# Patient Record
(demographics unavailable — no encounter records)

---

## 2025-01-24 NOTE — PHYSICAL EXAM
[de-identified] : The patient is alert, cooperative, and oriented x 3. Pupils are equal and round. The patient does not have skin rash.   Gait is normal. Neck and back ROM are limited with pain. Tenderness at PVM. No myelopathy hand sign. Spurling test negative. SLR negative. DTR normal range. Motor and sensory are basically normal throughout bilateral U/E and L/E. Circulation of legs is normal. Bladder and bowel functions are normal. [de-identified] : Cervical spine MRI taken recently at [facility] (now in our system) shows cervical disc herniation at C5-6 and C6-7 (and behind C6 body), which is dominant on the right side, No clear spinal cord compression or nerve compression.  Lumbar spine MRI taken recently at [facility] (now in our system) shows cyst at L5-S1 left foramen slightly pinching left L5 nerve.

## 2025-01-24 NOTE — HISTORY OF PRESENT ILLNESS
[de-identified] : The patient is 39 years old female who has neck pain for years, on and off. She also has low back pain. The patient has right arm symptom at right finger tips. She has bilateral non specific leg pain (Rt worse than Lt). No clear cause of the symptoms. She was seen by neurosurgeon at [facility]. She is here for second opinion. Referred by Dr. Emery.   Pain Level:  10 Duration of Symptoms:  years Symptom course:  Getting worse Accident:  No   Previous Treatment:    Pain meds:  Yes    Physical therapy:  Yes    Epidural steroid injection:  Yes

## 2025-01-24 NOTE — CONSULT LETTER
[Dear  ___] : Dear  [unfilled], [Consult Letter:] : I had the pleasure of evaluating your patient, [unfilled]. [Please see my note below.] : Please see my note below. [Consult Closing:] : Thank you very much for allowing me to participate in the care of this patient.  If you have any questions, please do not hesitate to contact me. [Sincerely,] : Sincerely, [FreeTextEntry3] : Kenneth Dukes MD PhD

## 2025-01-24 NOTE — DISCUSSION/SUMMARY
[de-identified] : I examined, evaluated, and discussed with the patient. The patient has neck and low back symptoms for years. No clear myelopathy. No clear arm or leg radiculopathy.   Based on physical exam and image findings, the patient diagnosis is cervical disc herniation at C5-6 and C6-7 and lumbar cyst at left L5-S1 foramen.   Treatment plan is cervical spine CT. 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  We had a long discussion about her symptom, physical exam and imaging study results. The patient understands everything and all questions were answered. The patient will return after cervical spine CT.

## 2025-02-14 NOTE — HISTORY OF PRESENT ILLNESS
[de-identified] : Follow-up of neck pain. She still has severe neck symptom. She cannot turn neck easily. She had CT scan.  Below is the history of initial visit. The patient is 39 years old female who has neck pain for years, on and off. She also has low back pain. The patient has right arm symptom at right finger tips. She has bilateral non specific leg pain (Rt worse than Lt). No clear cause of the symptoms. She was seen by neurosurgeon at [facility]. She is here for second opinion. Referred by Dr. Emery.   Pain Level:  10 Duration of Symptoms:  years Symptom course:  Getting worse Accident:  No   Previous Treatment:    Pain meds:  Yes    Physical therapy:  Yes    Epidural steroid injection:  Yes

## 2025-02-14 NOTE — PHYSICAL EXAM
[de-identified] : The patient is alert, cooperative, and oriented x 3. Pupils are equal and round. The patient does not have skin rash.   Gait is normal. Neck and back ROM are limited with pain. Tenderness at PVM. No myelopathy hand sign. Spurling test negative. SLR negative. DTR normal range. Motor and sensory are basically normal throughout bilateral U/E and L/E. Circulation of legs is normal. Bladder and bowel functions are normal. [de-identified] : Cervical spine MRI taken recently at [facility] (now in our system) shows cervical disc herniation at C5-6 and C6-7 (and behind C6 body), which is dominant on the right side, No clear spinal cord compression or nerve compression.  Lumbar spine MRI taken recently at [facility] (now in our system) shows cyst at L5-S1 left foramen slightly pinching left L5 nerve.  Cervical spine CT taken recently at [facility] shows moderate-severe spondylosis at C5-6 and C6-7, and calcification at C5-6 disc herniation.

## 2025-02-14 NOTE — DISCUSSION/SUMMARY
[de-identified] : I examined, evaluated, and discussed with the patient. The patient has neck and low back symptoms for years. No clear myelopathy. No clear arm or leg radiculopathy.   Based on physical exam and image findings, the patient diagnosis is cervical disc herniation at C5-6 and C6-7 and lumbar cyst at left L5-S1 foramen.   Treatment plan is anterior cervical discectomy and fusion C5-7. Risks, benefits, and alternatives were all discussed. She understands the expected outcomes of the surgery. She is interested in surgical treatment.  The patient understands everything and all questions were answered. The patient will return in late April to discuss surgery in June.